# Patient Record
Sex: FEMALE | Race: WHITE | URBAN - METROPOLITAN AREA
[De-identification: names, ages, dates, MRNs, and addresses within clinical notes are randomized per-mention and may not be internally consistent; named-entity substitution may affect disease eponyms.]

---

## 2020-11-05 ENCOUNTER — OFFICE VISIT (OUTPATIENT)
Dept: URBAN - METROPOLITAN AREA CLINIC 41 | Facility: CLINIC | Age: 85
End: 2020-11-05
Payer: MEDICARE

## 2020-11-05 DIAGNOSIS — H33.8 OTHER RETINAL DETACHMENTS: ICD-10-CM

## 2020-11-05 PROCEDURE — 67028 INJECTION EYE DRUG: CPT | Performed by: OPHTHALMOLOGY

## 2020-11-05 PROCEDURE — 92014 COMPRE OPH EXAM EST PT 1/>: CPT | Performed by: OPHTHALMOLOGY

## 2020-11-05 ASSESSMENT — INTRAOCULAR PRESSURE
OS: 14
OD: 7

## 2020-11-05 NOTE — IMPRESSION/PLAN
Impression: Macular degeneration, wet OS. Status: Symptomatic.
s/p Multiple Avastin last 04/25/19 (consented 11/08/18) s/p Lucentis x 49 last 09/24/2020 (consented 12/11/19) Plan: The patient notes continued distortion. In the past, the patient feels her VA is worsening with a 3 week interval, and so has required anti-VEGF more frequently than q monthly, as she has disease refractory to q monthly treatment. VA improved from CF to 20/100 with q2 week injections in the past (from a decline to CF in 11/2014). 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 Recommend Avastin R/B/A discussed with patient. Patient elects to proceed with Avastin today. After 2% Subconjunctival anesthesia & Betadine prep, Avastin was injected in the eye with no complications. Overfill discarded. Cold compress and Tylenol suggested for pain if needed. The patient understands the intention of these injections is to minimize continued vision loss. Thanks, Ailyn Argueta. 

Return in 4-6 weeks for follow up, OCT OS, re eval Nv AMD

## 2020-11-05 NOTE — IMPRESSION/PLAN
Impression: h/o Corneal Ulcer, OD. Status: Chronic.
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

## 2020-11-05 NOTE — IMPRESSION/PLAN
Impression: Macular scar, OD. Status: Chronic. Plan: Exam and OCT reveal a PED OD. Observe. Discussed AREDS/ I Vit and Amsler grid.

## 2020-12-17 ENCOUNTER — OFFICE VISIT (OUTPATIENT)
Dept: URBAN - METROPOLITAN AREA CLINIC 41 | Facility: CLINIC | Age: 85
End: 2020-12-17
Payer: MEDICARE

## 2020-12-17 PROCEDURE — 92014 COMPRE OPH EXAM EST PT 1/>: CPT | Performed by: OPHTHALMOLOGY

## 2020-12-17 PROCEDURE — 92134 CPTRZ OPH DX IMG PST SGM RTA: CPT | Performed by: OPHTHALMOLOGY

## 2020-12-17 PROCEDURE — 67028 INJECTION EYE DRUG: CPT | Performed by: OPHTHALMOLOGY

## 2020-12-17 ASSESSMENT — INTRAOCULAR PRESSURE
OS: 20
OD: 18

## 2021-01-28 ENCOUNTER — OFFICE VISIT (OUTPATIENT)
Dept: URBAN - METROPOLITAN AREA CLINIC 41 | Facility: CLINIC | Age: 86
End: 2021-01-28
Payer: MEDICARE

## 2021-01-28 PROCEDURE — 67028 INJECTION EYE DRUG: CPT | Performed by: OPHTHALMOLOGY

## 2021-01-28 PROCEDURE — 92134 CPTRZ OPH DX IMG PST SGM RTA: CPT | Performed by: OPHTHALMOLOGY

## 2021-01-28 PROCEDURE — 92014 COMPRE OPH EXAM EST PT 1/>: CPT | Performed by: OPHTHALMOLOGY

## 2021-01-28 ASSESSMENT — INTRAOCULAR PRESSURE
OD: 17
OS: 17

## 2021-01-28 NOTE — IMPRESSION/PLAN
Impression: Macular degeneration, wet OS. Status: Symptomatic.
s/p Multiple Avastin last 04/25/19 (consented 11/08/18) s/p Lucentis x 51 last 12/17/2020 (consented 12/11/19) Plan: In the past, the patient feels her VA is worsening with a 3 week interval, and so has required anti-VEGF more frequently than q monthly, as she has disease refractory to q monthly treatment. VA improved from CF to 20/100 with q2 week injections in the past (from a decline to CF in 11/2014). 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 Recommend Lucentis. R/B/A discussed with patient. Patient elects to proceed with Lucentis 0.5mg today. After 2% Subconjunctival anesthesia & Betadine prep, Lucentis was injected in the eye with no complications. Overfill discarded. Cold compress and Tylenol suggested for pain if needed. The patient understands the intention of these injections is to minimize continued vision loss. Thanks, Salinas Johnson. 

Return in 4-6 weeks for follow up, OCT OS, re eval Nv AMD

## 2021-02-25 ENCOUNTER — OFFICE VISIT (OUTPATIENT)
Dept: URBAN - METROPOLITAN AREA CLINIC 41 | Facility: CLINIC | Age: 86
End: 2021-02-25
Payer: MEDICARE

## 2021-02-25 DIAGNOSIS — H35.3221 EXDTVE AGE-REL MCLR DEGN, LEFT EYE, WITH ACTV CHRDL NEOVAS: Primary | ICD-10-CM

## 2021-02-25 PROCEDURE — 67028 INJECTION EYE DRUG: CPT | Performed by: OPHTHALMOLOGY

## 2021-02-25 PROCEDURE — 92014 COMPRE OPH EXAM EST PT 1/>: CPT | Performed by: OPHTHALMOLOGY

## 2021-02-25 PROCEDURE — 92134 CPTRZ OPH DX IMG PST SGM RTA: CPT | Performed by: OPHTHALMOLOGY

## 2021-02-25 ASSESSMENT — INTRAOCULAR PRESSURE
OD: 16
OS: 14

## 2021-02-25 NOTE — IMPRESSION/PLAN
Impression: Macular degeneration, wet OS. Status: Symptomatic.
s/p Multiple Avastin last 04/25/19 (consented 11/08/18) s/p Lucentis x 52  last 01/28/2021 (consented 12/11/19) Plan: The patient notes blurring. In the past, the patient feels her VA is worsening with a 3 week interval, and so has required anti-VEGF more frequently than q monthly, as she has disease refractory to q monthly treatment. VA improved from CF to 20/100 with q2 week injections in the past (from a decline to CF in 11/2014). 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 Recommend Lucentis. R/B/A discussed with patient. Patient elects to proceed with Lucentis 0.5mg today. After 2% Subconjunctival anesthesia & Betadine prep, Lucentis was injected in the eye with no complications. Overfill discarded. Cold compress and Tylenol suggested for pain if needed. The patient understands the intention of these injections is to minimize continued vision loss. Thanks, Go Sewell. 

Return in 4-6 weeks for follow up, OCT OS, re eval Nv AMD

## 2021-04-08 ENCOUNTER — OFFICE VISIT (OUTPATIENT)
Dept: URBAN - METROPOLITAN AREA CLINIC 41 | Facility: CLINIC | Age: 86
End: 2021-04-08
Payer: MEDICARE

## 2021-04-08 DIAGNOSIS — Z96.1 PRESENCE OF INTRAOCULAR LENS: ICD-10-CM

## 2021-04-08 DIAGNOSIS — H35.3213 EXUDATIVE AGE-REL MCLR DEGN, RIGHT EYE, WITH INACTIVE SCAR: ICD-10-CM

## 2021-04-08 DIAGNOSIS — H04.123 DRY EYE SYNDROME OF BILATERAL LACRIMAL GLANDS: ICD-10-CM

## 2021-04-08 PROCEDURE — 92134 CPTRZ OPH DX IMG PST SGM RTA: CPT | Performed by: OPHTHALMOLOGY

## 2021-04-08 PROCEDURE — 67028 INJECTION EYE DRUG: CPT | Performed by: OPHTHALMOLOGY

## 2021-04-08 PROCEDURE — 92014 COMPRE OPH EXAM EST PT 1/>: CPT | Performed by: OPHTHALMOLOGY

## 2021-04-08 ASSESSMENT — INTRAOCULAR PRESSURE
OD: 9
OS: 10

## 2021-04-08 NOTE — IMPRESSION/PLAN
Impression: Macular degeneration, wet OS. Status: Symptomatic.
s/p Multiple Avastin last 04/25/19 (consented 11/08/18) s/p Lucentis x 53  last 02/25/2021 (consented 12/11/19) Plan: The patient notes continued blurring. In the past, the patient feels her VA is worsening with a 3 week interval, and so has required anti-VEGF more frequently than q monthly, as she has disease refractory to q monthly treatment. VA improved from CF to 20/100 with q2 week injections in the past (from a decline to CF in 11/2014). 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 Recommend Lucentis. R/B/A discussed with patient. Patient elects to proceed with Lucentis 0.5mg today. After 2% Subconjunctival anesthesia & Betadine prep, Lucentis was injected in the eye with no complications. Overfill discarded. Cold compress and Tylenol suggested for pain if needed. The patient understands the intention of these injections is to minimize continued vision loss. Thanks, Jeremy Rizvi. 

Return in 4-6 weeks for follow up, OCT OS, re eval Nv AMD

## 2021-05-20 ENCOUNTER — OFFICE VISIT (OUTPATIENT)
Dept: URBAN - METROPOLITAN AREA CLINIC 41 | Facility: CLINIC | Age: 86
End: 2021-05-20
Payer: MEDICARE

## 2021-05-20 DIAGNOSIS — H35.3211 EXUDATIVE AGE-RELATED MACULAR DEGENERATION, RIGHT EYE, WITH ACTIVE CHOROIDAL NEOVASCULARIZATION: ICD-10-CM

## 2021-05-20 DIAGNOSIS — H18.891 OTHER SPECIFIED DISORDERS OF CORNEA, RIGHT EYE: ICD-10-CM

## 2021-05-20 DIAGNOSIS — H43.811 VITREOUS DEGENERATION, RIGHT EYE: ICD-10-CM

## 2021-05-20 PROCEDURE — 67028 INJECTION EYE DRUG: CPT | Performed by: OPHTHALMOLOGY

## 2021-05-20 PROCEDURE — 92014 COMPRE OPH EXAM EST PT 1/>: CPT | Performed by: OPHTHALMOLOGY

## 2021-05-20 PROCEDURE — 92134 CPTRZ OPH DX IMG PST SGM RTA: CPT | Performed by: OPHTHALMOLOGY

## 2021-05-20 ASSESSMENT — INTRAOCULAR PRESSURE
OD: 12
OS: 13

## 2021-05-20 NOTE — IMPRESSION/PLAN
Impression: Macular degeneration, wet OS. Status: Symptomatic.
s/p Multiple Avastin last 04/25/19 (consented 11/08/18) s/p Lucentis x 54  last 04/08/2021 Plan: In the past, the patient feels her VA is worsening with a 3 week interval, and so has required anti-VEGF more frequently than q monthly, as she has disease refractory to q monthly treatment. VA improved from CF to 20/100 with q2 week injections in the past (from a decline to CF in 11/2014). 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 Recommend Lucentis. R/B/A discussed with patient. Patient elects to proceed with Lucentis 0.5mg today. After 2% Subconjunctival anesthesia & Betadine prep, Lucentis was injected in the eye with no complications. Overfill discarded. Cold compress and Tylenol suggested for pain if needed. The patient understands the intention of these injections is to minimize continued vision loss. Thanks, Kandi Serrato. 

Return in 4-6 weeks for follow up, OCT OS, re eval Nv AMD

## 2022-09-12 NOTE — IMPRESSION/PLAN
Impression: Macular degeneration, wet OS. Status: Symptomatic.
s/p Multiple Avastin last 04/25/19 (consented 11/08/18) s/p Lucentis x 50 last 11/5/2020 (consented 12/11/19) Plan: The patient notes continued distortion. In the past, the patient feels her VA is worsening with a 3 week interval, and so has required anti-VEGF more frequently than q monthly, as she has disease refractory to q monthly treatment. VA improved from CF to 20/100 with q2 week injections in the past (from a decline to CF in 11/2014). 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 Recommend Lucentis. R/B/A discussed with patient. Patient elects to proceed with Lucentis 0.5mg today. After 2% Subconjunctival anesthesia & Betadine prep, Lucentis was injected in the eye with no complications. Overfill discarded. Cold compress and Tylenol suggested for pain if needed. The patient understands the intention of these injections is to minimize continued vision loss. Thanks, Damien Bell. 

Return in 4-6 weeks for follow up, OCT OS, re eval Nv AMD Palliative Care